# Patient Record
Sex: FEMALE | Race: WHITE | NOT HISPANIC OR LATINO | Employment: FULL TIME | URBAN - METROPOLITAN AREA
[De-identification: names, ages, dates, MRNs, and addresses within clinical notes are randomized per-mention and may not be internally consistent; named-entity substitution may affect disease eponyms.]

---

## 2019-11-01 ENCOUNTER — TRANSCRIBE ORDERS (OUTPATIENT)
Dept: ADMINISTRATIVE | Facility: HOSPITAL | Age: 54
End: 2019-11-01

## 2019-11-01 DIAGNOSIS — M22.41 CHONDROMALACIA OF RIGHT PATELLA: Primary | ICD-10-CM

## 2019-11-05 ENCOUNTER — HOSPITAL ENCOUNTER (OUTPATIENT)
Dept: MRI IMAGING | Facility: HOSPITAL | Age: 54
Discharge: HOME | End: 2019-11-05
Payer: COMMERCIAL

## 2019-11-05 DIAGNOSIS — M22.41 CHONDROMALACIA OF RIGHT PATELLA: ICD-10-CM

## 2019-11-05 PROCEDURE — 73721 MRI JNT OF LWR EXTRE W/O DYE: CPT

## 2021-03-10 DIAGNOSIS — Z23 ENCOUNTER FOR IMMUNIZATION: ICD-10-CM

## 2021-04-06 ENCOUNTER — IMMUNIZATIONS (OUTPATIENT)
Dept: FAMILY MEDICINE CLINIC | Facility: HOSPITAL | Age: 56
End: 2021-04-06

## 2021-04-06 DIAGNOSIS — Z23 ENCOUNTER FOR IMMUNIZATION: Primary | ICD-10-CM

## 2021-04-06 PROCEDURE — 91300 SARS-COV-2 / COVID-19 MRNA VACCINE (PFIZER-BIONTECH) 30 MCG: CPT

## 2021-04-06 PROCEDURE — 0001A SARS-COV-2 / COVID-19 MRNA VACCINE (PFIZER-BIONTECH) 30 MCG: CPT

## 2021-05-01 ENCOUNTER — IMMUNIZATIONS (OUTPATIENT)
Dept: FAMILY MEDICINE CLINIC | Facility: HOSPITAL | Age: 56
End: 2021-05-01

## 2021-05-01 DIAGNOSIS — Z23 ENCOUNTER FOR IMMUNIZATION: Primary | ICD-10-CM

## 2021-05-01 PROCEDURE — 91300 SARS-COV-2 / COVID-19 MRNA VACCINE (PFIZER-BIONTECH) 30 MCG: CPT

## 2021-05-01 PROCEDURE — 0002A SARS-COV-2 / COVID-19 MRNA VACCINE (PFIZER-BIONTECH) 30 MCG: CPT

## 2021-08-13 ENCOUNTER — TELEPHONE (OUTPATIENT)
Dept: PAIN MEDICINE | Facility: CLINIC | Age: 56
End: 2021-08-13

## 2021-12-29 ENCOUNTER — VBI (OUTPATIENT)
Dept: ADMINISTRATIVE | Facility: OTHER | Age: 56
End: 2021-12-29

## 2022-01-12 ENCOUNTER — ANNUAL EXAM (OUTPATIENT)
Dept: OBGYN CLINIC | Facility: CLINIC | Age: 57
End: 2022-01-12
Payer: COMMERCIAL

## 2022-01-12 VITALS
SYSTOLIC BLOOD PRESSURE: 124 MMHG | DIASTOLIC BLOOD PRESSURE: 84 MMHG | HEIGHT: 68 IN | BODY MASS INDEX: 40.01 KG/M2 | WEIGHT: 264 LBS

## 2022-01-12 DIAGNOSIS — Z12.4 SCREENING FOR MALIGNANT NEOPLASM OF THE CERVIX: ICD-10-CM

## 2022-01-12 DIAGNOSIS — Z01.419 ROUTINE GYNECOLOGICAL EXAMINATION: Primary | ICD-10-CM

## 2022-01-12 DIAGNOSIS — Z12.31 ENCOUNTER FOR SCREENING MAMMOGRAM FOR MALIGNANT NEOPLASM OF BREAST: ICD-10-CM

## 2022-01-12 PROCEDURE — 99396 PREV VISIT EST AGE 40-64: CPT | Performed by: OBSTETRICS & GYNECOLOGY

## 2022-01-12 RX ORDER — METOPROLOL SUCCINATE 100 MG/1
TABLET, EXTENDED RELEASE ORAL
COMMUNITY

## 2022-01-12 RX ORDER — BENZONATATE 200 MG/1
CAPSULE ORAL
COMMUNITY
Start: 2021-12-09

## 2022-01-12 RX ORDER — SUMATRIPTAN 20 MG/1
SPRAY NASAL
COMMUNITY

## 2022-01-12 RX ORDER — CYCLOBENZAPRINE HCL 10 MG
TABLET ORAL
COMMUNITY
Start: 2021-11-19

## 2022-01-12 RX ORDER — FLUTICASONE PROPIONATE 50 MCG
SPRAY, SUSPENSION (ML) NASAL
COMMUNITY
Start: 2014-05-02

## 2022-01-12 RX ORDER — ATORVASTATIN CALCIUM 10 MG/1
TABLET, FILM COATED ORAL
COMMUNITY
Start: 2021-11-07

## 2022-01-12 RX ORDER — FAMOTIDINE 20 MG/1
TABLET, FILM COATED ORAL
COMMUNITY
Start: 2021-11-07

## 2022-01-12 RX ORDER — POTASSIUM CITRATE 10 MEQ/1
TABLET, EXTENDED RELEASE ORAL
COMMUNITY
Start: 2021-12-15

## 2022-01-12 RX ORDER — OLMESARTAN MEDOXOMIL 40 MG/1
TABLET ORAL
COMMUNITY
Start: 2022-01-10

## 2022-01-12 RX ORDER — TOPIRAMATE 100 MG/1
TABLET, FILM COATED ORAL
COMMUNITY
Start: 2021-11-07

## 2022-01-12 RX ORDER — BUPROPION HYDROCHLORIDE 300 MG/1
TABLET ORAL
COMMUNITY
Start: 2021-11-07

## 2022-01-12 NOTE — PROGRESS NOTES
[address] [facility] [address], [address], [facility], [address]    ASSESSMENT/PLAN: Neena August is a 55 y o   who presents for annual gynecologic exam     Encounter for routine gynecologic examination  - Routine well woman exam completed today  - Cervical Cancer Screening: Current ASCCP Guidelines reviewed  Last Pap: 2018   Next Pap Due: today  - HPV Vaccination status: Not immunized  - Contraceptive counseling discussed  Current contraception: Mirena IUD since 2015  - Breast Cancer Screening: Last Mammogram 12/10/2021, ordered  - Colorectal cancer screening was not ordered  - The following were reviewed in today's visit: breast self exam, mammography screening ordered and menopause    Additional problems addressed during this visit:  1  Routine gynecological examination  Comments:  Mirena in for 6 years  No menses but no menopausal sx  Will keep for one more year and then remove  2  Encounter for screening mammogram for malignant neoplasm of breast  -     Mammo screening bilateral w 3d & cad; Future; Expected date: 2023    3  Screening for malignant neoplasm of the cervix  -     IGP, Aptima HPV, Rfx 16/18,45        CC:  Annual Gynecologic Examination    HPI: Neena August is a 55 y o   who presents for annual gynecologic examination  HPI    The following portions of the patient's history were reviewed and updated as appropriate: She  has a past medical history of Depression, Gestational diabetes, Hypertension, IBS (irritable bowel syndrome), and Migraines  She  has a past surgical history that includes Colonoscopy; Mammo (historical) (); Ankle surgery; Anal fissurectomy; Knee surgery (Right, 2019); Foot neuroma surgery; and INSERTION OF INTRAUTERINE DEVICE (IUD) (2015)  Her family history is not on file  She  reports that she has never smoked  She has never used smokeless tobacco  She reports current alcohol use   She reports that she does not use drugs   Current Outpatient Medications   Medication Sig Dispense Refill    atorvastatin (LIPITOR) 10 mg tablet       benzonatate (TESSALON) 200 MG capsule TAKE 1 CAPSULE 2-3 TIMES A DAY AS NEEDED FOR COUGH      botulinum toxin Type A, Cosm, (BOTOX) 100 units SOLR       buPROPion (WELLBUTRIN XL) 300 mg 24 hr tablet       cyclobenzaprine (FLEXERIL) 10 mg tablet TAKE 1/2 TO 1 TABLET BY MOUTH TWO TIMES A DAY AS NEEDED FOR SPASMS      famotidine (PEPCID) 20 mg tablet       fluticasone (FLONASE) 50 mcg/act nasal spray into each nostril      levonorgestrel (MIRENA) 20 MCG/24HR IUD 1 each by Intrauterine route once      metFORMIN (GLUCOPHAGE) 500 mg tablet       metoprolol succinate (TOPROL-XL) 100 mg 24 hr tablet       olmesartan (BENICAR) 40 mg tablet       potassium citrate (UROCIT-K) 10 mEq TAKE THREE TABLETS BY MOUTH TWICE A DAY      SUMAtriptan (IMITREX) 20 MG/ACT nasal spray       topiramate (TOPAMAX) 100 mg tablet        No current facility-administered medications for this visit  She has No Known Allergies       Review of Systems      Objective:  /84 (BP Location: Left arm, Patient Position: Sitting, Cuff Size: Standard)   Ht 5' 8" (1 727 m)   Wt 120 kg (264 lb)   BMI 40 14 kg/m²    Physical Exam      PE:  General Appearance: alert and oriented, in no acute distress  HEENT: PERRL, thyroid without masses or tenderness  Breast: No masses, tenderness, skin changes, nipple D/C or axillary or supraclavicular adenopathy  Abdomen: Soft, non-tender, non-distended, no masses, no rebound or guarding  Pelvic:       External genitalia: Normal appearance, no abnormal pigmentation, no lesions or masses  Normal Bartholin's and [place]'s  Urinary system: Urethral meatus normal, bladder non-tender  Vaginal: normal mucosa without prolapse or lesions   Normal-appearing physiologic discharge      Cervix: Normal-appearing, well-epithelialized, no gross lesions or masses No cervical motion tenderness  Adnexa: No adnexal masses or tenderness noted  Uterus: Normal-sized, regular contour, midline, mobile, no uterine tenderness  Extremities: Normal range of motion     Skin: normal, no rash or abnormalities  Neurologic: alert, oriented x3  Psychiatric: Appropriate affect, mood stable, cooperative with exam

## 2022-01-17 LAB
CYTOLOGIST CVX/VAG CYTO: NORMAL
DX ICD CODE: NORMAL
HPV I/H RISK 4 DNA CVX QL PROBE+SIG AMP: NEGATIVE
OTHER STN SPEC: NORMAL
PATH REPORT.FINAL DX SPEC: NORMAL
SL AMB NOTE:: NORMAL
SL AMB SPECIMEN ADEQUACY: NORMAL
SL AMB TEST METHODOLOGY: NORMAL

## 2022-12-08 ENCOUNTER — TELEPHONE (OUTPATIENT)
Dept: OBGYN CLINIC | Facility: CLINIC | Age: 57
End: 2022-12-08

## 2022-12-08 NOTE — TELEPHONE ENCOUNTER
Inés [email or web address] requesting her mammo order be uploaded to "my chart"  Return call to Virginia Whitehead, explained how to access imaging orders in my chart  If unsuccessful, c/b for [facility] staff to print for her to

## 2023-01-26 ENCOUNTER — ANNUAL EXAM (OUTPATIENT)
Dept: OBGYN CLINIC | Facility: CLINIC | Age: 58
End: 2023-01-26

## 2023-01-26 VITALS
DIASTOLIC BLOOD PRESSURE: 68 MMHG | SYSTOLIC BLOOD PRESSURE: 126 MMHG | BODY MASS INDEX: 38.74 KG/M2 | HEIGHT: 67 IN | WEIGHT: 246.8 LBS

## 2023-01-26 DIAGNOSIS — Z12.31 ENCOUNTER FOR SCREENING MAMMOGRAM FOR MALIGNANT NEOPLASM OF BREAST: ICD-10-CM

## 2023-01-26 DIAGNOSIS — Z01.419 ROUTINE GYNECOLOGICAL EXAMINATION: Primary | ICD-10-CM

## 2023-01-26 NOTE — PROGRESS NOTES
[id number] [place] [address] [facility] [address]    ASSESSMENT/PLAN: Nasreen Salmeron is a 65 y o   who presents for annual gynecologic Mila Rose now in for 7 years  No menses since placement  Has never had menopausal symptoms so unable to determine status  Would like to keep 1 more year and she understands that this is off label  Encounter for routine gynecologic examination  - Routine well woman exam completed today  - Cervical Cancer Screening: Current ASCCP Guidelines reviewed  Last Pap: 2022   Next Pap Due:   - HPV Vaccination status: Not immunized  - Contraceptive counseling discussed  Current contraception: Mirena IUD since   - Breast Cancer Screening: Last Mammogram 12/10/2021, ordered  - Colorectal cancer screening was not ordered  - The following were reviewed in today's visit: breast self exam, mammography screening ordered and menopause    Additional problems addressed during this visit:  1  Routine gynecological examination    2  Encounter for screening mammogram for malignant neoplasm of breast  -     Mammo screening bilateral w 3d & cad; Future        CC:  Annual Gynecologic Examination    HPI: Nasreen Salmeron is a 65 y o   who presents for annual gynecologic examination  HPI    The following portions of the patient's history were reviewed and updated as appropriate: She  has a past medical history of Depression, Gestational diabetes, Hypertension, IBS (irritable bowel syndrome), and Migraines  She  has a past surgical history that includes Colonoscopy; Mammo (historical) (); Ankle surgery; Anal fissurectomy; Knee surgery (Right, 2019); Foot neuroma surgery; and INSERTION OF INTRAUTERINE DEVICE (IUD) (2015)  Her family history is not on file  She  reports that she has never smoked  She has never used smokeless tobacco  She reports current alcohol use  She reports that she does not use drugs    Current Outpatient Medications   Medication Sig Dispense Refill   • atorvastatin (LIPITOR) 10 mg tablet      • botulinum toxin Type A, Cosm, (BOTOX) 100 units SOLR      • buPROPion (WELLBUTRIN XL) 300 mg 24 hr tablet      • famotidine (PEPCID) 20 mg tablet      • levonorgestrel (MIRENA) 20 MCG/24HR IUD 1 each by Intrauterine route once     • metFORMIN (GLUCOPHAGE) 500 mg tablet      • metoprolol succinate (TOPROL-XL) 100 mg 24 hr tablet      • olmesartan (BENICAR) 40 mg tablet      • potassium citrate (UROCIT-K) 10 mEq TAKE THREE TABLETS BY MOUTH TWICE A DAY     • SUMAtriptan (IMITREX) 20 MG/ACT nasal spray      • topiramate (TOPAMAX) 100 mg tablet        No current facility-administered medications for this visit  She has No Known Allergies       Review of Systems      Objective:  /68 (BP Location: Left arm, Patient Position: Sitting, Cuff Size: Standard)   Ht 5' 7" (1 702 m)   Wt 112 kg (246 lb 12 8 oz)   Breastfeeding No   BMI 38 65 kg/m²    Physical Exam      PE:  General Appearance: alert and oriented, in no acute distress  HEENT: PERRL, thyroid without masses or tenderness  Breast: No masses, tenderness, skin changes, nipple D/C or axillary or supraclavicular adenopathy  Abdomen: Soft, non-tender, non-distended, no masses, no rebound or guarding  Pelvic:       External genitalia: Normal appearance, no abnormal pigmentation, no lesions or masses  Normal Bartholin's and [place]'s  Urinary system: Urethral meatus normal, bladder non-tender  Vaginal: normal mucosa without prolapse or lesions  Normal-appearing physiologic discharge      Cervix: Normal-appearing, well-epithelialized, no gross lesions or masses No cervical motion tenderness  String in os      Adnexa: No adnexal masses or tenderness noted  Uterus: Normal-sized, regular contour, midline, mobile, no uterine tenderness  Extremities: Normal range of motion     Skin: normal, no rash or abnormalities  Neurologic: alert, oriented x3  Psychiatric: Appropriate affect, mood stable, cooperative with exam

## 2023-08-23 ENCOUNTER — TELEPHONE (OUTPATIENT)
Dept: PAIN MEDICINE | Facility: CLINIC | Age: 58
End: 2023-08-23

## 2024-01-15 DIAGNOSIS — Z12.31 ENCOUNTER FOR SCREENING MAMMOGRAM FOR MALIGNANT NEOPLASM OF BREAST: ICD-10-CM

## 2024-03-19 ENCOUNTER — TELEPHONE (OUTPATIENT)
Dept: PAIN MEDICINE | Facility: CLINIC | Age: 59
End: 2024-03-19

## 2024-03-28 ENCOUNTER — ANNUAL EXAM (OUTPATIENT)
Dept: OBGYN CLINIC | Facility: CLINIC | Age: 59
End: 2024-03-28
Payer: COMMERCIAL

## 2024-03-28 VITALS
WEIGHT: 214 LBS | HEIGHT: 67 IN | SYSTOLIC BLOOD PRESSURE: 136 MMHG | DIASTOLIC BLOOD PRESSURE: 80 MMHG | BODY MASS INDEX: 33.59 KG/M2

## 2024-03-28 DIAGNOSIS — Z01.419 ROUTINE GYNECOLOGICAL EXAMINATION: Primary | ICD-10-CM

## 2024-03-28 DIAGNOSIS — Z12.31 BREAST CANCER SCREENING BY MAMMOGRAM: ICD-10-CM

## 2024-03-28 DIAGNOSIS — Z30.432 ENCOUNTER FOR REMOVAL OF INTRAUTERINE CONTRACEPTIVE DEVICE: ICD-10-CM

## 2024-03-28 PROCEDURE — 58301 REMOVE INTRAUTERINE DEVICE: CPT | Performed by: OBSTETRICS & GYNECOLOGY

## 2024-03-28 PROCEDURE — 99396 PREV VISIT EST AGE 40-64: CPT | Performed by: OBSTETRICS & GYNECOLOGY

## 2024-03-28 RX ORDER — TIRZEPATIDE 5 MG/.5ML
INJECTION, SOLUTION SUBCUTANEOUS
COMMUNITY

## 2024-03-28 NOTE — PROGRESS NOTES
[facility] [place]  [address] [place] [address]    ASSESSMENT/PLAN: Inés Murguia is a 58 y.o.  who presents for annual gynecologic exam.No menses since placement of Mirena. Has never had menopausal symptoms so unable to determine status. Mirena removed today as it has been in for 9 years and she is 57 y/o.  Encounter for routine gynecologic examination  - Routine well woman exam completed today.  - Cervical Cancer Screening: Current ASCCP Guidelines reviewed. Last Pap: 2022 . Next Pap Due:   - HPV Vaccination status: Not immunized  - Contraceptive counseling discussed.  Current contraception: Mirena IUD since   - Breast Cancer Screening: Last Mammogram 2023, ordered  - Colorectal cancer screening was not ordered.  - The following were reviewed in today's visit: breast self exam, mammography screening ordered and menopause    Additional problems addressed during this visit:  1. Routine gynecological examination    2. Breast cancer screening by mammogram  -     Mammo screening bilateral w 3d & cad; Future        CC:  Annual Gynecologic Examination    HPI: Inés Murguia is a 58 y.o.  who presents for annual gynecologic examination.  HPI    The following portions of the patient's history were reviewed and updated as appropriate: She  has a past medical history of Depression, Gestational diabetes, Hypertension, IBS (irritable bowel syndrome), and Migraines.  She  has a past surgical history that includes Colonoscopy; Mammo (historical) (Bilateral, 2020); Ankle surgery; Anal fissurectomy; Knee surgery (Right, ); Foot neuroma surgery; and INSERTION OF INTRAUTERINE DEVICE (IUD) (2015).  Her family history includes ALS in her sister; Atrial fibrillation in her brother, sister, and sister; Dementia in her mother; Hypertension in her father and mother; No Known Problems in her daughter, maternal grandmother, and son.  She  reports that she has never smoked.  "She has never used smokeless tobacco. She reports current alcohol use. She reports that she does not use drugs.  Current Outpatient Medications   Medication Sig Dispense Refill    atorvastatin (LIPITOR) 10 mg tablet       botulinum toxin Type A, Cosm, (BOTOX) 100 units SOLR       buPROPion (WELLBUTRIN XL) 300 mg 24 hr tablet       famotidine (PEPCID) 20 mg tablet       levonorgestrel (MIRENA) 20 MCG/24HR IUD 1 each by Intrauterine route once      metFORMIN (GLUCOPHAGE) 500 mg tablet       metoprolol succinate (TOPROL-XL) 25 mg 24 hr tablet 25 mg      olmesartan (BENICAR) 40 mg tablet       potassium citrate (UROCIT-K) 10 mEq TAKE THREE TABLETS BY MOUTH TWICE A DAY      SUMAtriptan (IMITREX) 20 MG/ACT nasal spray       topiramate (TOPAMAX) 100 mg tablet       Zepbound 5 MG/0.5ML auto-injector Inject under the skin       No current facility-administered medications for this visit.     She has No Known Allergies..    Review of Systems      Objective:  /80 (BP Location: Left arm, Patient Position: Sitting, Cuff Size: Large)   Ht 5' 6.75\" (1.695 m)   Wt 97.1 kg (214 lb)   BMI 33.77 kg/m²    Physical Exam      PE:  General Appearance: alert and oriented, in no acute distress.   HEENT: PERRL, thyroid without masses or tenderness  Breast: No masses, tenderness, skin changes, nipple D/C or axillary or supraclavicular adenopathy  Abdomen: Soft, non-tender, non-distended, no masses, no rebound or guarding.  Pelvic:       External genitalia: Normal appearance, no abnormal pigmentation, no lesions or masses. Normal Bartholin's and [place]'s.      Urinary system: Urethral meatus normal, bladder non-tender.      Vaginal: normal mucosa without prolapse or lesions. Normal-appearing physiologic discharge      Cervix: Normal-appearing, well-epithelialized, no gross lesions or masses No cervical motion tenderness. String in os      Adnexa: No adnexal masses or tenderness noted.      Uterus: Normal-sized, regular contour, " midline, mobile, no uterine tenderness.  Extremities: Normal range of motion.   Skin: normal, no rash or abnormalities  Neurologic: alert, oriented x3  Psychiatric: Appropriate affect, mood stable, cooperative with exam.    Iud removal    Date/Time: 3/28/2024 11:00 AM    Performed by: Stephan Amin MD  Authorized by: Stephan Amin MD  Universal Protocol:  Consent: Verbal consent obtained.  Risks and benefits: risks, benefits and alternatives were discussed  Consent given by: patient  Patient understanding: patient states understanding of the procedure being performed  Patient identity confirmed: verbally with patient    Procedure:     Removed with no complications: yes

## 2024-11-11 DIAGNOSIS — Z00.6 ENCOUNTER FOR EXAMINATION FOR NORMAL COMPARISON OR CONTROL IN CLINICAL RESEARCH PROGRAM: ICD-10-CM

## 2025-01-06 DIAGNOSIS — Z12.31 BREAST CANCER SCREENING BY MAMMOGRAM: ICD-10-CM

## 2025-03-09 ENCOUNTER — RESULTS FOLLOW-UP (OUTPATIENT)
Dept: OBGYN CLINIC | Facility: CLINIC | Age: 60
End: 2025-03-09

## 2025-03-31 ENCOUNTER — ANNUAL EXAM (OUTPATIENT)
Dept: OBGYN CLINIC | Facility: CLINIC | Age: 60
End: 2025-03-31
Payer: COMMERCIAL

## 2025-03-31 VITALS
BODY MASS INDEX: 23.28 KG/M2 | DIASTOLIC BLOOD PRESSURE: 76 MMHG | WEIGHT: 153.6 LBS | SYSTOLIC BLOOD PRESSURE: 132 MMHG | HEIGHT: 68 IN

## 2025-03-31 DIAGNOSIS — Z01.419 ROUTINE GYNECOLOGICAL EXAMINATION: Primary | ICD-10-CM

## 2025-03-31 DIAGNOSIS — Z12.4 SCREENING FOR MALIGNANT NEOPLASM OF THE CERVIX: ICD-10-CM

## 2025-03-31 DIAGNOSIS — Z12.31 BREAST CANCER SCREENING BY MAMMOGRAM: ICD-10-CM

## 2025-03-31 PROCEDURE — S0612 ANNUAL GYNECOLOGICAL EXAMINA: HCPCS | Performed by: OBSTETRICS & GYNECOLOGY

## 2025-03-31 RX ORDER — SEMAGLUTIDE 1.7 MG/.75ML
INJECTION, SOLUTION SUBCUTANEOUS
COMMUNITY

## 2025-03-31 RX ORDER — DOXYCYCLINE 100 MG/1
1 CAPSULE ORAL 2 TIMES DAILY
COMMUNITY
Start: 2025-03-28

## 2025-03-31 NOTE — PROGRESS NOTES
[facility] [place]  [address] [id number]    ASSESSMENT/PLAN: Inés Murguia is a 59 y.o.  who presents for annual gynecologic exam.      Encounter for routine gynecologic examination  - Routine well woman exam completed today.  - Cervical Cancer Screening: Current ASCCP Guidelines reviewed. Last Pap: 2022 . Next Pap Due: today  - HPV Vaccination status: Not immunized  - Contraceptive counseling discussed.  Current contraception: menopause  - Breast Cancer Screening: Last Mammogram 2025, ordered  - Colorectal cancer screening was not ordered.  - The following were reviewed in today's visit: breast self exam, mammography screening ordered and menopause    Additional problems addressed during this visit:  1. Routine gynecological examination  2. Breast cancer screening by mammogram  -     Mammo screening bilateral w 3d and cad; Future  3. Screening for malignant neoplasm of the cervix  -     IGP, Aptima HPV, Rfx 16/18,45      CC:  Annual Gynecologic Examination    HPI: Inés Murguia is a 59 y.o.  who presents for annual gynecologic examination.  HPI    The following portions of the patient's history were reviewed and updated as appropriate: She  has a past medical history of Depression, Gestational diabetes, Hypertension, IBS (irritable bowel syndrome), Kidney stone, and Migraines.  She  has a past surgical history that includes Colonoscopy; Mammo (historical) (Bilateral, 2020); Ankle surgery; Anal fissurectomy; Knee surgery (Right, ); Foot neuroma surgery; and INSERTION OF INTRAUTERINE DEVICE (IUD) (2015).  Her family history includes ALS in her sister; Atrial fibrillation in her brother, sister, and sister; Dementia in her mother; Heart disease in her brother, father, and sister; Hypertension in her father and mother; Migraines in her brother, mother, sister, and sister; No Known Problems in her daughter, maternal grandmother, and son; Stroke in her  "brother.  She  reports that she has never smoked. She has never used smokeless tobacco. She reports current alcohol use. She reports that she does not use drugs.  Current Outpatient Medications   Medication Sig Dispense Refill    atorvastatin (LIPITOR) 10 mg tablet       botulinum toxin Type A, Cosm, (BOTOX) 100 units SOLR       buPROPion (WELLBUTRIN XL) 300 mg 24 hr tablet       doxycycline hyclate (VIBRAMYCIN) 100 mg capsule Take 1 capsule by mouth 2 (two) times a day      famotidine (PEPCID) 20 mg tablet       metFORMIN (GLUCOPHAGE) 500 mg tablet       metoprolol succinate (TOPROL-XL) 50 mg 24 hr tablet 50 mg      olmesartan (BENICAR) 40 mg tablet       potassium citrate (UROCIT-K) 10 mEq TAKE THREE TABLETS BY MOUTH TWICE A DAY      SUMAtriptan (IMITREX) 20 MG/ACT nasal spray       topiramate (TOPAMAX) 100 mg tablet       Wegovy 1.7 MG/0.75ML Inject under the skin      Zepbound 5 MG/0.5ML auto-injector Inject under the skin (Patient not taking: Reported on 3/31/2025)       No current facility-administered medications for this visit.     She has no known allergies..    Review of Systems      Objective:  /76 (BP Location: Left arm, Patient Position: Sitting, Cuff Size: Standard)   Ht 5' 7.5\" (1.715 m)   Wt 69.7 kg (153 lb 9.6 oz)   BMI 23.70 kg/m²    Physical Exam      PE:  General Appearance: alert and oriented, in no acute distress.   HEENT: PERRL, thyroid without masses or tenderness  Breast: No masses, tenderness, skin changes, nipple D/C or axillary or supraclavicular adenopathy  Abdomen: Soft, non-tender, non-distended, no masses, no rebound or guarding.  Pelvic:       External genitalia: Normal appearance, no abnormal pigmentation, no lesions or masses. Normal Bartholin's and [place]'s.      Urinary system: Urethral meatus normal, bladder non-tender.      Vaginal: normal mucosa without prolapse or lesions. Normal-appearing physiologic discharge      Cervix: Normal-appearing, well-epithelialized, no " gross lesions or masses No cervical motion tenderness.       Adnexa: No adnexal masses or tenderness noted.      Uterus: Normal-sized, regular contour, midline, mobile, no uterine tenderness.  Extremities: Normal range of motion.   Skin: normal, no rash or abnormalities  Neurologic: alert, oriented x3  Psychiatric: Appropriate affect, mood stable, cooperative with exam.

## 2025-04-03 LAB
CYTOLOGIST CVX/VAG CYTO: NORMAL
DX ICD CODE: NORMAL
HPV GENOTYPE REFLEX: NORMAL
HPV I/H RISK 4 DNA CVX QL PROBE+SIG AMP: NEGATIVE
OTHER STN SPEC: NORMAL
PATH REPORT.FINAL DX SPEC: NORMAL
SL AMB NOTE:: NORMAL
SL AMB SPECIMEN ADEQUACY: NORMAL
SL AMB TEST METHODOLOGY: NORMAL

## 2025-07-28 ENCOUNTER — TELEPHONE (OUTPATIENT)
Age: 60
End: 2025-07-28

## 2025-08-01 DIAGNOSIS — M17.0 BILATERAL PRIMARY OSTEOARTHRITIS OF KNEE: Primary | ICD-10-CM

## 2025-08-08 ENCOUNTER — OFFICE VISIT (OUTPATIENT)
Age: 60
End: 2025-08-08
Payer: COMMERCIAL

## 2025-08-08 VITALS
HEART RATE: 75 BPM | DIASTOLIC BLOOD PRESSURE: 80 MMHG | OXYGEN SATURATION: 100 % | TEMPERATURE: 98.2 F | SYSTOLIC BLOOD PRESSURE: 120 MMHG | BODY MASS INDEX: 22.46 KG/M2 | WEIGHT: 148.2 LBS | HEIGHT: 68 IN

## 2025-08-08 DIAGNOSIS — G43.009 MIGRAINE WITHOUT AURA, NOT REFRACTORY: ICD-10-CM

## 2025-08-08 DIAGNOSIS — E66.812 CLASS 2 SEVERE OBESITY WITH SERIOUS COMORBIDITY AND BODY MASS INDEX (BMI) OF 35.0 TO 35.9 IN ADULT, UNSPECIFIED OBESITY TYPE (HCC): Primary | ICD-10-CM

## 2025-08-08 DIAGNOSIS — E66.01 CLASS 2 SEVERE OBESITY WITH SERIOUS COMORBIDITY AND BODY MASS INDEX (BMI) OF 35.0 TO 35.9 IN ADULT, UNSPECIFIED OBESITY TYPE (HCC): Primary | ICD-10-CM

## 2025-08-08 DIAGNOSIS — E65 ABDOMINAL PANNUS: ICD-10-CM

## 2025-08-08 PROBLEM — G47.33 OBSTRUCTIVE SLEEP APNEA: Status: RESOLVED | Noted: 2021-06-01 | Resolved: 2025-08-08

## 2025-08-08 PROBLEM — E66.9 OBESITY: Status: ACTIVE | Noted: 2025-08-08

## 2025-08-08 PROBLEM — E78.00 HIGH CHOLESTEROL: Status: ACTIVE | Noted: 2025-08-08

## 2025-08-08 PROBLEM — I10 ESSENTIAL HYPERTENSION: Status: ACTIVE | Noted: 2017-01-24

## 2025-08-08 PROBLEM — G43.709 CHRONIC MIGRAINE WITHOUT AURA: Status: ACTIVE | Noted: 2025-02-20

## 2025-08-08 PROBLEM — F33.41 RECURRENT MAJOR DEPRESSIVE DISORDER, IN PARTIAL REMISSION (HCC): Chronic | Status: ACTIVE | Noted: 2025-08-08

## 2025-08-08 PROBLEM — N20.0 CALCULUS OF KIDNEY: Status: RESOLVED | Noted: 2021-06-01 | Resolved: 2025-08-08

## 2025-08-08 PROBLEM — N39.0 RECURRENT UTI: Status: RESOLVED | Noted: 2022-09-06 | Resolved: 2025-08-08

## 2025-08-08 PROBLEM — K21.9 GASTROESOPHAGEAL REFLUX DISEASE WITHOUT ESOPHAGITIS: Status: ACTIVE | Noted: 2025-08-08

## 2025-08-08 PROCEDURE — 99214 OFFICE O/P EST MOD 30 MIN: CPT | Performed by: NURSE PRACTITIONER

## 2025-08-08 RX ORDER — SEMAGLUTIDE 1.7 MG/.75ML
INJECTION, SOLUTION SUBCUTANEOUS
Status: CANCELLED | OUTPATIENT
Start: 2025-08-08

## 2025-08-21 ENCOUNTER — PROCEDURE VISIT (OUTPATIENT)
Age: 60
End: 2025-08-21
Payer: COMMERCIAL

## 2025-08-21 VITALS — WEIGHT: 149.2 LBS | HEIGHT: 68 IN | BODY MASS INDEX: 22.61 KG/M2

## 2025-08-21 DIAGNOSIS — M17.0 BILATERAL PRIMARY OSTEOARTHRITIS OF KNEE: Primary | ICD-10-CM

## 2025-08-21 PROBLEM — N13.2 HYDRONEPHROSIS WITH RENAL AND URETERAL CALCULOUS OBSTRUCTION: Status: ACTIVE | Noted: 2025-08-21

## 2025-08-21 PROBLEM — F41.9 ANXIETY DISORDER, UNSPECIFIED: Status: ACTIVE | Noted: 2025-08-21

## 2025-08-21 PROBLEM — K64.9 UNSPECIFIED HEMORRHOIDS: Status: ACTIVE | Noted: 2025-08-21

## 2025-08-21 PROBLEM — F33.9 MAJOR DEPRESSIVE DISORDER, RECURRENT, UNSPECIFIED (HCC): Status: ACTIVE | Noted: 2025-08-21

## 2025-08-21 PROBLEM — E78.00 PURE HYPERCHOLESTEROLEMIA: Status: ACTIVE | Noted: 2025-08-21

## 2025-08-21 PROBLEM — K58.9 IRRITABLE BOWEL SYNDROME: Status: ACTIVE | Noted: 2025-08-21

## 2025-08-21 PROBLEM — G24.3 ISOLATED CERVICAL DYSTONIA: Status: ACTIVE | Noted: 2025-08-21

## 2025-08-21 PROBLEM — J20.9 ACUTE BRONCHITIS, UNSPECIFIED: Status: ACTIVE | Noted: 2025-08-21

## 2025-08-21 PROBLEM — E66.09 EXOGENOUS OBESITY: Status: ACTIVE | Noted: 2025-08-21

## 2025-08-21 PROBLEM — G43.019 MIGRAINE WITHOUT AURA, INTRACTABLE, WITHOUT STATUS MIGRAINOSUS: Status: ACTIVE | Noted: 2025-08-21

## 2025-08-21 PROBLEM — M17.12 UNILATERAL PRIMARY OSTEOARTHRITIS, LEFT KNEE: Status: ACTIVE | Noted: 2025-08-21

## 2025-08-21 PROBLEM — N12 TUBULO-INTERSTITIAL NEPHRITIS, NOT SPECIFIED AS ACUTE OR CHRONIC: Status: ACTIVE | Noted: 2025-08-21

## 2025-08-21 PROBLEM — N95.1 PERIMENOPAUSE: Status: ACTIVE | Noted: 2025-08-21

## 2025-08-21 PROBLEM — L65.9 LOSS OF HAIR: Status: ACTIVE | Noted: 2025-08-21

## 2025-08-21 PROBLEM — R73.01 IMPAIRED FASTING GLUCOSE: Status: ACTIVE | Noted: 2025-08-21

## 2025-08-21 PROBLEM — K64.5 THROMBOSED EXTERNAL HEMORRHOIDS: Status: ACTIVE | Noted: 2025-08-21

## 2025-08-21 PROBLEM — N23 URETERIC COLIC: Status: ACTIVE | Noted: 2025-08-21

## 2025-08-21 PROBLEM — M17.11 UNILATERAL PRIMARY OSTEOARTHRITIS, RIGHT KNEE: Status: ACTIVE | Noted: 2025-08-21

## 2025-08-21 PROBLEM — K21.00 GASTROESOPHAGEAL REFLUX DISEASE WITH ESOPHAGITIS: Status: ACTIVE | Noted: 2025-08-21

## 2025-08-21 PROBLEM — I10 ESSENTIAL (PRIMARY) HYPERTENSION: Status: ACTIVE | Noted: 2025-08-21

## 2025-08-21 PROCEDURE — 20610 DRAIN/INJ JOINT/BURSA W/O US: CPT | Performed by: ORTHOPAEDIC SURGERY
